# Patient Record
Sex: MALE | Race: WHITE | URBAN - METROPOLITAN AREA
[De-identification: names, ages, dates, MRNs, and addresses within clinical notes are randomized per-mention and may not be internally consistent; named-entity substitution may affect disease eponyms.]

---

## 2021-07-22 ENCOUNTER — OFFICE VISIT (OUTPATIENT)
Dept: URBAN - METROPOLITAN AREA CLINIC 33 | Facility: CLINIC | Age: 51
End: 2021-07-22
Payer: COMMERCIAL

## 2021-07-22 DIAGNOSIS — E11.3521 TYPE 2 DIABETES MELLITUS WITH PROLIFERATIVE DIABETIC RETINOPATHY WITH TRACTION RETINAL DETACHMENT INVOLVING THE MACULA, RIGHT EYE: ICD-10-CM

## 2021-07-22 PROCEDURE — 92020 GONIOSCOPY: CPT | Performed by: OPTOMETRIST

## 2021-07-22 PROCEDURE — 99204 OFFICE O/P NEW MOD 45 MIN: CPT | Performed by: OPTOMETRIST

## 2021-07-22 ASSESSMENT — INTRAOCULAR PRESSURE
OD: 14
OS: 14

## 2021-07-22 NOTE — IMPRESSION/PLAN
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 Plan: Diabetes type II: Proliferative diabetic retinopathy noted. Educated patient about the pathophysiology of today's findings and encouraged strict adherence to treatment by primary care provider. Discussed the patient's vision complaints and how complaints are related to the lack of blood sugar control.  Patient counseled about the complexity of the disease and urgent need for treatment with retina specialist.

## 2021-07-22 NOTE — IMPRESSION/PLAN
Impression: Anatomical narrow angle, bilateral: H40.033. Plan: Discussed condition with patient. Goniotomy ordered and performed today. Based on today's findings, recommend evaluation with glaucoma specialist for LPI.

## 2021-09-13 ENCOUNTER — OFFICE VISIT (OUTPATIENT)
Dept: URBAN - METROPOLITAN AREA CLINIC 33 | Facility: CLINIC | Age: 51
End: 2021-09-13
Payer: COMMERCIAL

## 2021-09-13 DIAGNOSIS — H40.033 ANATOMICAL NARROW ANGLE, BILATERAL: Primary | ICD-10-CM

## 2021-09-13 PROCEDURE — 76514 ECHO EXAM OF EYE THICKNESS: CPT | Performed by: OPHTHALMOLOGY

## 2021-09-13 PROCEDURE — 92020 GONIOSCOPY: CPT | Performed by: OPHTHALMOLOGY

## 2021-09-13 PROCEDURE — 99204 OFFICE O/P NEW MOD 45 MIN: CPT | Performed by: OPHTHALMOLOGY

## 2021-09-13 PROCEDURE — 92133 CPTRZD OPH DX IMG PST SGM ON: CPT | Performed by: OPHTHALMOLOGY

## 2021-09-13 RX ORDER — PREDNISOLONE ACETATE 10 MG/ML
1 % SUSPENSION/ DROPS OPHTHALMIC
Qty: 1 | Refills: 0 | Status: INACTIVE
Start: 2021-09-13 | End: 2021-11-11

## 2021-09-13 ASSESSMENT — INTRAOCULAR PRESSURE
OD: 14
OS: 14

## 2021-09-13 NOTE — IMPRESSION/PLAN
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 Plan: Discussed diagnosis in detail with patient. Advised patient of condition.  Retina eval after LPI OU

## 2021-09-13 NOTE — IMPRESSION/PLAN
Impression: Anatomical narrow angle, bilateral: H40.033. /531, 9/21 OCT 37/84 5/7, GCC 28/39 Plan: ok for LPI OU in [facility], RL2. Discussed narrow angles, risk of angle closure, symptoms of AACG and Laser peripheral iridotomy (LPI) risk/benefits/alternatives. Discussed that pt should avoid dilation and anti-depression, anti-anxiety, anti-histamine, or other medications contraindicated in angle closure glaucoma until the laser has been performed. Discussed risk of irreversible vision loss with glaucoma. Pt voiced understanding.

## 2021-10-25 ENCOUNTER — SURGERY (OUTPATIENT)
Dept: URBAN - METROPOLITAN AREA SURGERY 15 | Facility: SURGERY | Age: 51
End: 2021-10-25
Payer: COMMERCIAL

## 2021-11-03 ENCOUNTER — POST-OPERATIVE VISIT (OUTPATIENT)
Dept: URBAN - METROPOLITAN AREA CLINIC 33 | Facility: CLINIC | Age: 51
End: 2021-11-03

## 2021-11-03 DIAGNOSIS — Z48.810 ENCOUNTER FOR SURGICAL AFTERCARE FOLLOWING SURGERY ON A SENSE ORGAN: Primary | ICD-10-CM

## 2021-11-03 PROCEDURE — 99024 POSTOP FOLLOW-UP VISIT: CPT | Performed by: OPTOMETRIST

## 2021-11-03 ASSESSMENT — INTRAOCULAR PRESSURE
OS: 14
OD: 14

## 2021-11-03 NOTE — IMPRESSION/PLAN
Impression: S/P LPI (Laser Peripheral Iridotomy) OU - 9 Days. Encounter for surgical aftercare following surgery on a sense organ  Z48.810. Successful LPI OU. Patient reports waking up one day 4 years ago and vision was blurry OU. Patient saw retinal specialist and had laser done OS only. Keep appointment with Dr. George Martin.  Plan:

## 2021-11-11 ENCOUNTER — OFFICE VISIT (OUTPATIENT)
Dept: URBAN - METROPOLITAN AREA CLINIC 33 | Facility: CLINIC | Age: 51
End: 2021-11-11
Payer: COMMERCIAL

## 2021-11-11 DIAGNOSIS — H43.311 VITREOUS MEMBRANES AND STRANDS, RIGHT EYE: ICD-10-CM

## 2021-11-11 DIAGNOSIS — H43.11 VITREOUS HEMORRHAGE, RIGHT EYE: ICD-10-CM

## 2021-11-11 DIAGNOSIS — E10.3592 TYPE 1 DIABETES MELLITUS W/ PROLIFERATIVE DIABETIC RETINOPATHY W/O MACULAR EDEMA, LEFT EYE: ICD-10-CM

## 2021-11-11 DIAGNOSIS — E10.3531 TYPE 1 DIABETES MELLITUS WITH PROLIFERATIVE DIABETIC RETINOPATHY WITH TRACTION RETINAL DETACHMENT NOT INVOLVING THE MACULA, RIGHT EYE: Primary | ICD-10-CM

## 2021-11-11 PROCEDURE — 92134 CPTRZ OPH DX IMG PST SGM RTA: CPT | Performed by: OPHTHALMOLOGY

## 2021-11-11 PROCEDURE — 99214 OFFICE O/P EST MOD 30 MIN: CPT | Performed by: OPHTHALMOLOGY

## 2021-11-11 RX ORDER — OFLOXACIN 3 MG/ML
0.3 % SOLUTION/ DROPS OPHTHALMIC
Qty: 5 | Refills: 3 | Status: ACTIVE
Start: 2021-11-11

## 2021-11-11 RX ORDER — PREDNISOLONE ACETATE 10 MG/ML
1 % SUSPENSION/ DROPS OPHTHALMIC
Qty: 10 | Refills: 5 | Status: ACTIVE
Start: 2021-11-11

## 2021-11-11 ASSESSMENT — INTRAOCULAR PRESSURE
OS: 15
OD: 14

## 2021-11-11 NOTE — IMPRESSION/PLAN
Impression: Type 1 diabetes mellitus with proliferative diabetic retinopathy with traction retinal detachment not involving the macula, right eye: E10.3531. Right. Condition: new problem addtl w/u needed. Vision: vision affected. DM Type I 37 yrs, A1C 9.0 Plan: Discussed diagnosis in detail with patient. Discussed risks of progression. Exam OD shows VH w/ traction, prominent adhesion ST arcade, TRD not involving macula. OCT OD shows vitreous debris w/ irregular surface contour. Surgical treatment is recommended to repair the retina and remove the blood PPVx RIGHT EYE. Surgical risks and benefits were discussed, explained and understood by patient. Unable to tell how much vision will be recovered. Discussed gas bubble and post-op care: no traveling, flying or high altitude for approximately 6 - 8 weeks. All questions answered. Patient elects to proceed with recommendation. RL1. Educational material provided to patient.  ERxed drops to patients pharmacy on file: Ofloxacin and Prednisolone

## 2021-11-11 NOTE — IMPRESSION/PLAN
Impression: Vitreous membranes and strands, right eye: H43.311. Right. Condition: new problem addtl w/u needed. Vision: vision affected. Plan: Advised patient of condition. Discussed diagnosis in detail with patient. Discussed treatment options with patient. Surgical treatment is required PPVx RIGHT EYE. See notes above.

## 2021-11-11 NOTE — IMPRESSION/PLAN
Impression: Vitreous hemorrhage, right eye: H43.11. Right. Condition: new problem addtl w/u needed. Vision: vision affected. Plan: Advised patient of condition. Discussed diagnosis in detail with patient. Discussed treatment options with patient. Surgical treatment is required, see notes above.

## 2021-11-11 NOTE — IMPRESSION/PLAN
Impression: Type 1 diabetes mellitus w/ proliferative diabetic retinopathy w/o macular edema, left eye: F65.1162. Left. Condition: stable. Vision: vision affected. prior laser tx elsewhere Plan: Discussed diagnosis in detail with patient. Exam shows minimal Diabetic changes, the macula is stable and quiet w/ peripheral scarring and previous laser tx. OCT OS confirms the macula is stable, no active edema. No treatment is recommended at this time. Emphasized blood sugar control and advised to keep future appointments with PCP and/or Endocrinologist for the management of Diabetes. Recommend observation for now.

## 2021-12-01 ENCOUNTER — SURGERY (OUTPATIENT)
Dept: URBAN - METROPOLITAN AREA SURGERY 15 | Facility: SURGERY | Age: 51
End: 2021-12-01
Payer: COMMERCIAL

## 2021-12-01 PROCEDURE — 67113 REPAIR RETINAL DETACH CPLX: CPT | Performed by: OPHTHALMOLOGY

## 2021-12-02 ENCOUNTER — POST-OPERATIVE VISIT (OUTPATIENT)
Dept: URBAN - METROPOLITAN AREA CLINIC 33 | Facility: CLINIC | Age: 51
End: 2021-12-02

## 2021-12-02 PROCEDURE — 99024 POSTOP FOLLOW-UP VISIT: CPT | Performed by: OPTOMETRIST

## 2021-12-02 ASSESSMENT — INTRAOCULAR PRESSURE
OS: 14
OD: 12

## 2021-12-02 NOTE — IMPRESSION/PLAN
Impression: S/P 28692; PPVX; ERMX; Laser photocoagulation - ENDO LASER; Intravitreal injection of medications: KENALOG; Intravitreal Injection of gas - C3F8 16% OD - 1 Day. Encounter for surgical aftercare following surgery on a sense organ  Z48.810. Post operative instructions reviewed - Plan: 1 week Dr. Tanya Mcgregor --Continue Ofloxacin 0.3% Pred QID OD

## 2021-12-09 ENCOUNTER — POST-OPERATIVE VISIT (OUTPATIENT)
Dept: URBAN - METROPOLITAN AREA CLINIC 33 | Facility: CLINIC | Age: 51
End: 2021-12-09
Payer: COMMERCIAL

## 2021-12-09 PROCEDURE — 99024 POSTOP FOLLOW-UP VISIT: CPT | Performed by: OPHTHALMOLOGY

## 2021-12-09 ASSESSMENT — INTRAOCULAR PRESSURE
OD: 17
OS: 18

## 2021-12-09 NOTE — IMPRESSION/PLAN
Impression: S/P 78162; PPVX; ERMX; Laser photocoagulation - ENDO LASER; Intravitreal injection of medications: KENALOG; Intravitreal Injection of gas - C3F8 16% OD - 8 Days. Encounter for surgical aftercare following surgery on a sense organ  Z48.810. Excellent post op course   Post operative instructions reviewed - Condition is improving - Plan: Exam OD shows the retina is in good position with Gas. Explained the vision will slowly improve as the gas dissolves. Patient can sleep in any position. OCT OD unable to obtain. Continue with Prednisolone OD QID until gone, d/c Ofloxacin. Will reassess the retina in 2 - 3 wks.

## 2022-01-06 ENCOUNTER — POST-OPERATIVE VISIT (OUTPATIENT)
Dept: URBAN - METROPOLITAN AREA CLINIC 33 | Facility: CLINIC | Age: 52
End: 2022-01-06
Payer: COMMERCIAL

## 2022-01-06 PROCEDURE — 99024 POSTOP FOLLOW-UP VISIT: CPT | Performed by: OPHTHALMOLOGY

## 2022-01-06 ASSESSMENT — INTRAOCULAR PRESSURE
OD: 14
OS: 14

## 2022-01-06 NOTE — IMPRESSION/PLAN
Impression: S/P 20063; PPVX; ERMX; Laser photocoagulation - ENDO LASER; Intravitreal injection of medications: KENALOG; Intravitreal Injection of gas - C3F8 16% OD - 36 Days. Encounter for surgical aftercare following surgery on a sense organ  Z48.810. Excellent post op course   Post operative instructions reviewed - Condition is improving - Plan: S/P 06828; PPVX; ERMX; Laser photocoagulation - ENDO LASER; Intravitreal injection of medications: KENALOG; Intravitreal Injection of gas - C3F8 16% OD - 36 Days. Encounter for surgical aftercare following surgery on a sense organ  Z48.810. Excellent post op course   Post operative instructions reviewed - Condition is improving - OCT shows stable retina appears attached. Exam shows 45% gas bubble. Return in 1 month with Dr. Bill Bangura for DE OCT. --Advised patient to use artificial tears for comfort. 
--Continue Prednisolone acetate 1%

## 2022-02-03 ENCOUNTER — POST-OPERATIVE VISIT (OUTPATIENT)
Dept: URBAN - METROPOLITAN AREA CLINIC 33 | Facility: CLINIC | Age: 52
End: 2022-02-03
Payer: COMMERCIAL

## 2022-02-03 PROCEDURE — 99024 POSTOP FOLLOW-UP VISIT: CPT | Performed by: OPHTHALMOLOGY

## 2022-02-03 ASSESSMENT — INTRAOCULAR PRESSURE
OS: 11
OD: 10

## 2022-02-03 NOTE — IMPRESSION/PLAN
Impression: S/P 62719; PPVX; ERMX; Laser photocoagulation - ENDO LASER; Intravitreal injection of medications: KENALOG; Intravitreal Injection of gas - C3F8 16% OD - 64 Days. Encounter for surgical aftercare following surgery on a sense organ  Z48.810. Excellent post op course   Condition is improving - Plan: Exam OD shows retina is fully attached. OCT OD shows fully attached centrally. No restriction, patient can resume full activities. Recommend a retina f/u in 2 mos.